# Patient Record
Sex: FEMALE | Race: BLACK OR AFRICAN AMERICAN | NOT HISPANIC OR LATINO | Employment: FULL TIME | URBAN - METROPOLITAN AREA
[De-identification: names, ages, dates, MRNs, and addresses within clinical notes are randomized per-mention and may not be internally consistent; named-entity substitution may affect disease eponyms.]

---

## 2023-12-18 PROCEDURE — 87205 SMEAR GRAM STAIN: CPT

## 2023-12-19 ENCOUNTER — LAB REQUISITION (OUTPATIENT)
Dept: LAB | Facility: HOSPITAL | Age: 43
End: 2023-12-19
Payer: COMMERCIAL

## 2023-12-19 DIAGNOSIS — N77.1 VAGINITIS, VULVITIS AND VULVOVAGINITIS IN DISEASES CLASSIFIED ELSEWHERE: ICD-10-CM

## 2023-12-20 LAB
CLUE CELLS VAG LPF-#/AREA: ABNORMAL /[LPF]
NUGENT SCORE: 7
YEAST VAG WET PREP-#/AREA: ABNORMAL

## 2024-04-09 ENCOUNTER — HOSPITAL ENCOUNTER (EMERGENCY)
Facility: HOSPITAL | Age: 44
Discharge: HOME | End: 2024-04-09
Attending: STUDENT IN AN ORGANIZED HEALTH CARE EDUCATION/TRAINING PROGRAM
Payer: COMMERCIAL

## 2024-04-09 ENCOUNTER — APPOINTMENT (OUTPATIENT)
Dept: CARDIOLOGY | Facility: HOSPITAL | Age: 44
End: 2024-04-09
Payer: COMMERCIAL

## 2024-04-09 ENCOUNTER — APPOINTMENT (OUTPATIENT)
Dept: RADIOLOGY | Facility: HOSPITAL | Age: 44
End: 2024-04-09
Payer: COMMERCIAL

## 2024-04-09 VITALS
OXYGEN SATURATION: 97 % | HEIGHT: 63 IN | TEMPERATURE: 97.9 F | WEIGHT: 165.34 LBS | BODY MASS INDEX: 29.3 KG/M2 | DIASTOLIC BLOOD PRESSURE: 77 MMHG | HEART RATE: 74 BPM | RESPIRATION RATE: 15 BRPM | SYSTOLIC BLOOD PRESSURE: 151 MMHG

## 2024-04-09 DIAGNOSIS — R07.9 CHEST PAIN, UNSPECIFIED TYPE: Primary | ICD-10-CM

## 2024-04-09 DIAGNOSIS — R00.2 PALPITATIONS: ICD-10-CM

## 2024-04-09 LAB
ALBUMIN SERPL-MCNC: 4.6 G/DL (ref 3.5–5)
ALP BLD-CCNC: 83 U/L (ref 35–125)
ALT SERPL-CCNC: 14 U/L (ref 5–40)
ANION GAP SERPL CALC-SCNC: 12 MMOL/L
APPEARANCE UR: CLEAR
AST SERPL-CCNC: 19 U/L (ref 5–40)
BASOPHILS # BLD AUTO: 0.04 X10*3/UL (ref 0–0.1)
BASOPHILS NFR BLD AUTO: 0.4 %
BILIRUB SERPL-MCNC: 0.9 MG/DL (ref 0.1–1.2)
BILIRUB UR STRIP.AUTO-MCNC: NEGATIVE MG/DL
BUN SERPL-MCNC: 8 MG/DL (ref 8–25)
CALCIUM SERPL-MCNC: 10 MG/DL (ref 8.5–10.4)
CHLORIDE SERPL-SCNC: 96 MMOL/L (ref 97–107)
CO2 SERPL-SCNC: 25 MMOL/L (ref 24–31)
COLOR UR: COLORLESS
CREAT SERPL-MCNC: 0.9 MG/DL (ref 0.4–1.6)
EGFRCR SERPLBLD CKD-EPI 2021: 82 ML/MIN/1.73M*2
EOSINOPHIL # BLD AUTO: 0.05 X10*3/UL (ref 0–0.7)
EOSINOPHIL NFR BLD AUTO: 0.5 %
ERYTHROCYTE [DISTWIDTH] IN BLOOD BY AUTOMATED COUNT: 11.8 % (ref 11.5–14.5)
GLUCOSE SERPL-MCNC: 90 MG/DL (ref 65–99)
GLUCOSE UR STRIP.AUTO-MCNC: NORMAL MG/DL
HCG UR QL IA.RAPID: NEGATIVE
HCT VFR BLD AUTO: 41.1 % (ref 36–46)
HGB BLD-MCNC: 14 G/DL (ref 12–16)
IMM GRANULOCYTES # BLD AUTO: 0.03 X10*3/UL (ref 0–0.7)
IMM GRANULOCYTES NFR BLD AUTO: 0.3 % (ref 0–0.9)
KETONES UR STRIP.AUTO-MCNC: ABNORMAL MG/DL
LEUKOCYTE ESTERASE UR QL STRIP.AUTO: NEGATIVE
LIPASE SERPL-CCNC: 26 U/L (ref 16–63)
LYMPHOCYTES # BLD AUTO: 3.13 X10*3/UL (ref 1.2–4.8)
LYMPHOCYTES NFR BLD AUTO: 29.7 %
MCH RBC QN AUTO: 29.6 PG (ref 26–34)
MCHC RBC AUTO-ENTMCNC: 34.1 G/DL (ref 32–36)
MCV RBC AUTO: 87 FL (ref 80–100)
MONOCYTES # BLD AUTO: 0.87 X10*3/UL (ref 0.1–1)
MONOCYTES NFR BLD AUTO: 8.3 %
NEUTROPHILS # BLD AUTO: 6.42 X10*3/UL (ref 1.2–7.7)
NEUTROPHILS NFR BLD AUTO: 60.8 %
NITRITE UR QL STRIP.AUTO: NEGATIVE
NRBC BLD-RTO: 0 /100 WBCS (ref 0–0)
PH UR STRIP.AUTO: 6 [PH]
PLATELET # BLD AUTO: 237 X10*3/UL (ref 150–450)
POTASSIUM SERPL-SCNC: 4.3 MMOL/L (ref 3.4–5.1)
PROT SERPL-MCNC: 6.9 G/DL (ref 5.9–7.9)
PROT UR STRIP.AUTO-MCNC: NEGATIVE MG/DL
RBC # BLD AUTO: 4.73 X10*6/UL (ref 4–5.2)
RBC # UR STRIP.AUTO: NEGATIVE /UL
SODIUM SERPL-SCNC: 133 MMOL/L (ref 133–145)
SP GR UR STRIP.AUTO: 1
TROPONIN T SERPL-MCNC: <6 NG/L
UROBILINOGEN UR STRIP.AUTO-MCNC: NORMAL MG/DL
WBC # BLD AUTO: 10.5 X10*3/UL (ref 4.4–11.3)

## 2024-04-09 PROCEDURE — 83690 ASSAY OF LIPASE: CPT | Performed by: PHYSICIAN ASSISTANT

## 2024-04-09 PROCEDURE — 81003 URINALYSIS AUTO W/O SCOPE: CPT | Performed by: PHYSICIAN ASSISTANT

## 2024-04-09 PROCEDURE — 74177 CT ABD & PELVIS W/CONTRAST: CPT | Performed by: RADIOLOGY

## 2024-04-09 PROCEDURE — 71046 X-RAY EXAM CHEST 2 VIEWS: CPT | Performed by: RADIOLOGY

## 2024-04-09 PROCEDURE — 99285 EMERGENCY DEPT VISIT HI MDM: CPT | Mod: 25

## 2024-04-09 PROCEDURE — 2550000001 HC RX 255 CONTRASTS: Performed by: PHYSICIAN ASSISTANT

## 2024-04-09 PROCEDURE — 93005 ELECTROCARDIOGRAM TRACING: CPT

## 2024-04-09 PROCEDURE — 96361 HYDRATE IV INFUSION ADD-ON: CPT

## 2024-04-09 PROCEDURE — 71046 X-RAY EXAM CHEST 2 VIEWS: CPT

## 2024-04-09 PROCEDURE — 36415 COLL VENOUS BLD VENIPUNCTURE: CPT | Performed by: STUDENT IN AN ORGANIZED HEALTH CARE EDUCATION/TRAINING PROGRAM

## 2024-04-09 PROCEDURE — 74177 CT ABD & PELVIS W/CONTRAST: CPT

## 2024-04-09 PROCEDURE — 85025 COMPLETE CBC W/AUTO DIFF WBC: CPT | Performed by: STUDENT IN AN ORGANIZED HEALTH CARE EDUCATION/TRAINING PROGRAM

## 2024-04-09 PROCEDURE — 80053 COMPREHEN METABOLIC PANEL: CPT | Performed by: STUDENT IN AN ORGANIZED HEALTH CARE EDUCATION/TRAINING PROGRAM

## 2024-04-09 PROCEDURE — 96375 TX/PRO/DX INJ NEW DRUG ADDON: CPT

## 2024-04-09 PROCEDURE — 96374 THER/PROPH/DIAG INJ IV PUSH: CPT

## 2024-04-09 PROCEDURE — 81025 URINE PREGNANCY TEST: CPT | Performed by: PHYSICIAN ASSISTANT

## 2024-04-09 PROCEDURE — 84484 ASSAY OF TROPONIN QUANT: CPT | Performed by: STUDENT IN AN ORGANIZED HEALTH CARE EDUCATION/TRAINING PROGRAM

## 2024-04-09 PROCEDURE — 2500000004 HC RX 250 GENERAL PHARMACY W/ HCPCS (ALT 636 FOR OP/ED): Performed by: PHYSICIAN ASSISTANT

## 2024-04-09 PROCEDURE — 2500000001 HC RX 250 WO HCPCS SELF ADMINISTERED DRUGS (ALT 637 FOR MEDICARE OP): Performed by: PHYSICIAN ASSISTANT

## 2024-04-09 RX ORDER — FAMOTIDINE 20 MG/1
40 TABLET, FILM COATED ORAL DAILY
Qty: 30 TABLET | Refills: 0 | Status: SHIPPED | OUTPATIENT
Start: 2024-04-09 | End: 2024-04-24

## 2024-04-09 RX ORDER — FAMOTIDINE 10 MG/ML
20 INJECTION INTRAVENOUS ONCE
Status: COMPLETED | OUTPATIENT
Start: 2024-04-09 | End: 2024-04-09

## 2024-04-09 RX ORDER — NAPROXEN SODIUM 220 MG/1
324 TABLET, FILM COATED ORAL ONCE
Status: COMPLETED | OUTPATIENT
Start: 2024-04-09 | End: 2024-04-09

## 2024-04-09 RX ORDER — ONDANSETRON HYDROCHLORIDE 2 MG/ML
4 INJECTION, SOLUTION INTRAVENOUS ONCE
Status: COMPLETED | OUTPATIENT
Start: 2024-04-09 | End: 2024-04-09

## 2024-04-09 RX ADMIN — ONDANSETRON 4 MG: 2 INJECTION INTRAMUSCULAR; INTRAVENOUS at 19:06

## 2024-04-09 RX ADMIN — ASPIRIN 81 MG CHEWABLE TABLET 324 MG: 81 TABLET CHEWABLE at 19:06

## 2024-04-09 RX ADMIN — FAMOTIDINE 20 MG: 10 INJECTION, SOLUTION INTRAVENOUS at 19:05

## 2024-04-09 RX ADMIN — IOHEXOL 75 ML: 350 INJECTION, SOLUTION INTRAVENOUS at 20:02

## 2024-04-09 RX ADMIN — SODIUM CHLORIDE 1000 ML: 900 INJECTION, SOLUTION INTRAVENOUS at 19:06

## 2024-04-09 SDOH — HEALTH STABILITY: MENTAL HEALTH: HAVE YOU ACTUALLY HAD ANY THOUGHTS OF KILLING YOURSELF?: NO

## 2024-04-09 SDOH — HEALTH STABILITY: MENTAL HEALTH: SUICIDE ASSESSMENT: ADULT (C-SSRS)

## 2024-04-09 SDOH — HEALTH STABILITY: MENTAL HEALTH: HAVE YOU EVER DONE ANYTHING, STARTED TO DO ANYTHING, OR PREPARED TO DO ANYTHING TO END YOUR LIFE?: NO

## 2024-04-09 SDOH — HEALTH STABILITY: MENTAL HEALTH: HAVE YOU WISHED YOU WERE DEAD OR WISHED YOU COULD GO TO SLEEP AND NOT WAKE UP?: NO

## 2024-04-09 ASSESSMENT — PAIN - FUNCTIONAL ASSESSMENT: PAIN_FUNCTIONAL_ASSESSMENT: 0-10

## 2024-04-09 ASSESSMENT — PAIN SCALES - GENERAL: PAINLEVEL_OUTOF10: 6

## 2024-04-09 ASSESSMENT — PAIN DESCRIPTION - PAIN TYPE: TYPE: ACUTE PAIN

## 2024-04-09 ASSESSMENT — PAIN DESCRIPTION - DESCRIPTORS: DESCRIPTORS: TIGHTNESS;PRESSURE

## 2024-04-09 ASSESSMENT — COLUMBIA-SUICIDE SEVERITY RATING SCALE - C-SSRS
2. HAVE YOU ACTUALLY HAD ANY THOUGHTS OF KILLING YOURSELF?: NO
1. IN THE PAST MONTH, HAVE YOU WISHED YOU WERE DEAD OR WISHED YOU COULD GO TO SLEEP AND NOT WAKE UP?: NO
6. HAVE YOU EVER DONE ANYTHING, STARTED TO DO ANYTHING, OR PREPARED TO DO ANYTHING TO END YOUR LIFE?: NO

## 2024-04-09 ASSESSMENT — PAIN DESCRIPTION - PROGRESSION: CLINICAL_PROGRESSION: GRADUALLY WORSENING

## 2024-04-09 ASSESSMENT — PAIN DESCRIPTION - LOCATION: LOCATION: CHEST

## 2024-04-09 ASSESSMENT — PAIN DESCRIPTION - FREQUENCY: FREQUENCY: CONSTANT/CONTINUOUS

## 2024-04-09 ASSESSMENT — PAIN DESCRIPTION - ONSET: ONSET: GRADUAL

## 2024-04-09 NOTE — ED PROVIDER NOTES
HPI   Chief Complaint   Patient presents with    Chest Pain    Anxiety    Palpitations     X 1 week of palpitations, hx of svt, feels like she can't get a full breath,light headed,  chest tightness and pressure x 1 days, hx of anxiety , nauseated , been having heartburn over the last week        This is a 43-year-old female who presents to the emergency department with complaints of tightness in her chest with palpitations.  Patient states that she does have a history of SVT for which she takes metoprolol.  Patient states for the last month she has been having episodes of palpitations.  She states that palpitations can happen both at rest and with activity.  She states that when she is having the palpitations there is no associated chest pain or shortness of breath.  She states that she began to experience a tight sensation in her chest today which is not consistent with her palpitations and previous episodes of anxiety which prompted her to come to the emergency department.  Patient has been having episodes of chest discomfort over the last month without alleviating or exacerbating factors.  She states that for the last month or so she has also been having what she believes is acid reflux as she has a burning sensation in the middle of her chest radiating up to her throat.  She has also had episodes of nausea with eating and had 1 episode of vomiting today due to to nausea after eating.  She states she has been having increased belching.  She states that she did wear a Zio patch prior approximately 2 years ago.  She states that she does see a psychiatrist for her anxiety.  She is not suicidal or homicidal.  She is starting therapy on Monday.  States that she has also been having abdominal pain for the last week.  The pain is located in the right upper quadrant right lower quadrant.  She has not had any dysuria, hematuria, increased urgency or frequency.  No diarrhea or constipation.  No recent travel.  She does not  have a history of cancer, no recent travel, no hemoptysis, no previous surgeries to include abdominal surgeries, and she does take oral birth control.       Please see HPI for pertinent positive and negative ROS.                     No data recorded                   Patient History   History reviewed. No pertinent past medical history.  Past Surgical History:   Procedure Laterality Date    OTHER SURGICAL HISTORY  05/05/2022    No history of surgery     No family history on file.  Social History     Tobacco Use    Smoking status: Not on file    Smokeless tobacco: Not on file   Substance Use Topics    Alcohol use: Not on file    Drug use: Not on file       Physical Exam   ED Triage Vitals [04/09/24 1821]   Temperature Heart Rate Respirations BP   36.6 °C (97.9 °F) 83 16 (!) 171/95      Pulse Ox Temp Source Heart Rate Source Patient Position   100 % Oral Monitor Sitting      BP Location FiO2 (%)     Right arm --       Physical Exam  GENERAL APPEARANCE: Awake and alert. No acute distress.   VITAL SIGNS: As per the nurses' triage record.  HEENT: Normocephalic, atraumatic. Extraocular muscles are intact. Conjunctiva are pink. Negative scleral icterus. Mucous membranes are moist. Tongue in the midline. Oropharynx clear, uvula midline.   NECK: Soft, nontender and supple, full gross ROM, no meningeal signs.  CHEST: Nontender to palpation. Clear to auscultation bilaterally. No rales, rhonchi, or wheezing. Symmetric rise and fall of chest wall.   HEART: Clear S1 and S2. Regular rate and rhythm. No murmurs appreciated on auscultation.  Strong and equal pulses in the extremities.  ABDOMEN: Soft, nontender, nondistended, positive bowel sounds, no palpable masses.  MUSCULOSKELETAL: The calves are nontender to palpation.  No calf swelling bilaterally.  Full gross active range of motion. Ambulating on own with no acute difficulties  NEUROLOGICAL: Awake, alert and oriented x 3. Motor power intact in the upper and lower extremities.  Sensation is intact to light touch in the upper and lower extremities. Patient answering questions appropriately.   IMMUNOLOGICAL: No lymphatic streaking noted  DERMATOLOGIC: Warm and dry without petechiae, rashes, or ecchymosis noted on visible skin.   PYSCH: Cooperative with appropriate mood and affect.  ED Course & MDM   Diagnoses as of 04/09/24 2120   Chest pain, unspecified type   Palpitations       Medical Decision Making  Parts of this chart have been completed using voice recognition software. Please excuse any errors of transcription.  My thought process and reason for plan has been formulated from the time that I saw the patient until the time of disposition and is not specific to one specific moment during their visit and furthermore my MDM encompasses this entire chart and not only this text box.      HPI: Detailed above.    Exam: A medically appropriate exam performed, outlined above, given the known history and presentation.    History obtained from: Patient    EKG: See my supervising physician's EKG interpretation    Medications given during visit:  Medications   aspirin chewable tablet 324 mg (324 mg oral Given 4/9/24 1906)   famotidine PF (Pepcid) injection 20 mg (20 mg intravenous Given 4/9/24 1905)   ondansetron (Zofran) injection 4 mg (4 mg intravenous Given 4/9/24 1906)   sodium chloride 0.9 % bolus 1,000 mL (1,000 mL intravenous New Bag 4/9/24 1906)   iohexol (OMNIPaque) 350 mg iodine/mL solution 75 mL (75 mL intravenous Given 4/9/24 2002)        Diagnostic/tests  Labs Reviewed   COMPREHENSIVE METABOLIC PANEL - Abnormal       Result Value    Glucose 90      Sodium 133      Potassium 4.3      Chloride 96 (*)     Bicarbonate 25      Urea Nitrogen 8      Creatinine 0.90      eGFR 82      Calcium 10.0      Albumin 4.6      Alkaline Phosphatase 83      Total Protein 6.9      AST 19      Bilirubin, Total 0.9      ALT 14      Anion Gap 12     URINALYSIS WITH REFLEX CULTURE AND MICROSCOPIC - Abnormal     Color, Urine Colorless (*)     Appearance, Urine Clear      Specific Gravity, Urine 1.003 (*)     pH, Urine 6.0      Protein, Urine NEGATIVE      Glucose, Urine Normal      Blood, Urine NEGATIVE      Ketones, Urine TRACE (*)     Bilirubin, Urine NEGATIVE      Urobilinogen, Urine Normal      Nitrite, Urine NEGATIVE      Leukocyte Esterase, Urine NEGATIVE     SERIAL TROPONIN, INITIAL ([facility]) - Normal    Troponin T, High Sensitivity <6     HCG, URINE, QUALITATIVE - Normal    HCG, Urine NEGATIVE     LIPASE - Normal    Lipase 26     CBC WITH AUTO DIFFERENTIAL    WBC 10.5      nRBC 0.0      RBC 4.73      Hemoglobin 14.0      Hematocrit 41.1      MCV 87      MCH 29.6      MCHC 34.1      RDW 11.8      Platelets 237      Neutrophils % 60.8      Immature Granulocytes %, Automated 0.3      Lymphocytes % 29.7      Monocytes % 8.3      Eosinophils % 0.5      Basophils % 0.4      Neutrophils Absolute 6.42      Immature Granulocytes Absolute, Automated 0.03      Lymphocytes Absolute 3.13      Monocytes Absolute 0.87      Eosinophils Absolute 0.05      Basophils Absolute 0.04     TROPONIN T SERIES, HIGH SENSITIVITY (0, 2 HR, 6 HR)    Narrative:     The following orders were created for panel order Troponin T Series, High Sensitivity (0, 2HR, 6HR).  Procedure                               Abnormality         Status                     ---------                               -----------         ------                     Serial Troponin, Initial...[[id number]]  Normal              Final result               Serial Troponin, 2 Hour ...[[id number]]                                                   Please view results for these tests on the individual orders.   SERIAL TROPONIN,  2 HOUR ([facility])   URINALYSIS WITH REFLEX CULTURE AND MICROSCOPIC    Narrative:     The following orders were created for panel order Urinalysis with Reflex Culture and Microscopic.  Procedure                               Abnormality         Status                      ---------                               -----------         ------                     Urinalysis with Reflex C...[[id number]]  Abnormal            Final result               Extra Urine Gray Tube[[id number]]                                                         Please view results for these tests on the individual orders.   EXTRA URINE GRAY TUBE      CT abdomen pelvis w IV contrast   Final Result   1. No acute abnormality within the abdomen or pelvis.   2. No periappendiceal inflammation.   3. Mild prominence without overt enlargement of multiple right mid   abdominal and lower quadrant lymph nodes. Consider mesenteric   adenitis.        MACRO:   None        Signed by: Sanjeev Quiroz 4/9/2024 8:44 PM   Dictation workstation:   [id number]      XR chest 2 views   Final Result   1.  No evidence of acute cardiopulmonary process.                  MACRO:   None        Signed by: Lokesh Pichardo 4/9/2024 7:36 PM   Dictation workstation:   [id number]           Considerations/further MDM:  Patient was seen in conjucntion with my supervising physician,  Dr. Pickens. Please refer to his note.    Patient presents hypertensive, afebrile, 83 bpm, 16 for respirations, 100% on room air.    Differential diagnosis includes but is not limited to acute coronary syndrome including myocardial infarction versus pneumothorax versus pneumonia versus pulmonary embolism versus aortic dissection versus GERD    PERC rule for pulmonary embolism is 1 given patient is on oral birth control.  She does not have any evidence of hypoxia or tachycardia.  She is not short of breath currently.  Therefore, I do have low suspicion for pulmonary embolism and further workup was not clinically indicated.    Initial troponin T HS <6, repeat troponin T was not clinically indicated as patient symptoms have been ongoing x 1 month.  Also showed no evidence of urinary tract infection.  CBC showed no leukocytosis or anemia.  Negative hCG.  Lipase 26.  CMP  showed no electro disturbance or acute kidney injury.    Chest x-ray shows no acute cardiopulmonary process.  CT scan of abdomen pelvis with IV contrast shows no acute findings.  There was lymphadenopathy consistent with possible mesenteric adenitis.  This could explain patient's aching abdominal discomfort x 1 week.    Patient was treated with IV fluids, IV famotidine, IV Zofran, and oral aspirin.  Patient symptoms improved in the emergency department.  Patient is not suicidal or homicidal.  She states that she does have close psychiatric follow-up for her anxiety and do not need to speak to anyone in the emergency department tonight.    Due to history of SVT with patient not seeing cardiology in 2 years, I did place a referral to cardiology for patient to follow-up with.    The patient was evaluated in the emergency department and an etiology requiring emergent treatment or admission to hospital was not identified.  Prescription for famotidine given.  All labs, imaging, and diagnostic studies were reviewed by me and the patient was counseled on clinical impression, expectations, and plan.  Patient was educated to follow-up with PCP in the following 1 to 2 days.  After trial of famotidine, PCP may determine if GI referral is indicated.  Patient also referred to cardiology.  All questions from patient were answered.  They elicited understanding and were agreeable to course of treatment.  Patient was discharged in stable condition and given strict return precautions including new or worsening symptoms.      Procedure  Procedures     Kailee Douglas PA-C  04/09/24 2129

## 2024-04-10 ASSESSMENT — HEART SCORE
HISTORY: SLIGHTLY SUSPICIOUS
RISK FACTORS: NO KNOWN RISK FACTORS
ECG: NORMAL
HEART SCORE: 0
AGE: <45
TROPONIN: LESS THAN OR EQUAL TO NORMAL LIMIT

## 2024-04-10 NOTE — DISCHARGE INSTRUCTIONS
Please follow-up with cardiology listed on your discharge paperwork.  Please follow-up with your primary care doctor next 1 to 2 days.  Please take famotidine as prescribed.  A paper prescription was included in your discharge paperwork.  Please avoid greasy, acidic or spicy foods.  Please return to the emergency department with new or worsening symptoms or the onset of new symptoms.

## 2024-06-19 LAB
ATRIAL RATE: 75 BPM
P AXIS: 45 DEGREES
P OFFSET: 199 MS
P ONSET: 156 MS
PR INTERVAL: 132 MS
Q ONSET: 222 MS
QRS COUNT: 13 BEATS
QRS DURATION: 70 MS
QT INTERVAL: 392 MS
QTC CALCULATION(BAZETT): 437 MS
QTC FREDERICIA: 422 MS
R AXIS: 16 DEGREES
T AXIS: -1 DEGREES
T OFFSET: 418 MS
VENTRICULAR RATE: 75 BPM

## 2025-04-07 ENCOUNTER — OFFICE VISIT (OUTPATIENT)
Dept: PRIMARY CARE | Facility: EXTERNAL LOCATION | Age: 45
End: 2025-04-07
Payer: COMMERCIAL

## 2025-04-07 VITALS
TEMPERATURE: 99 F | HEART RATE: 65 BPM | SYSTOLIC BLOOD PRESSURE: 136 MMHG | OXYGEN SATURATION: 97 % | DIASTOLIC BLOOD PRESSURE: 84 MMHG

## 2025-04-07 DIAGNOSIS — T14.8XXA ABRASION: Primary | ICD-10-CM

## 2025-04-07 PROBLEM — J45.909 ASTHMA: Status: ACTIVE | Noted: 2024-02-15

## 2025-04-07 PROBLEM — E66.9 OBESITY (BMI 30-39.9): Status: ACTIVE | Noted: 2024-03-15

## 2025-04-07 PROBLEM — F07.81 POSTCONCUSSION SYNDROME: Status: ACTIVE | Noted: 2025-04-07

## 2025-04-07 PROBLEM — H90.0 CONDUCTIVE HEARING LOSS, BILATERAL: Status: ACTIVE | Noted: 2025-04-07

## 2025-04-07 PROBLEM — I47.10 SUPRAVENTRICULAR TACHYCARDIA: Status: ACTIVE | Noted: 2024-02-15

## 2025-04-07 PROBLEM — R42 DIZZINESS: Status: ACTIVE | Noted: 2025-04-07

## 2025-04-07 PROBLEM — E55.9 VITAMIN D DEFICIENCY, UNSPECIFIED: Status: ACTIVE | Noted: 2024-02-15

## 2025-04-07 PROBLEM — N95.1 PERIMENOPAUSAL: Status: ACTIVE | Noted: 2024-02-15

## 2025-04-07 PROBLEM — S09.90XA INJURY OF HEAD: Status: ACTIVE | Noted: 2025-04-07

## 2025-04-07 PROBLEM — K21.9 GERD (GASTROESOPHAGEAL REFLUX DISEASE): Status: ACTIVE | Noted: 2025-04-07

## 2025-04-07 PROBLEM — R51.9 HEADACHE: Status: ACTIVE | Noted: 2025-04-07

## 2025-04-07 PROBLEM — S06.0XAA BRAIN CONCUSSION: Status: ACTIVE | Noted: 2025-04-07

## 2025-04-07 PROBLEM — G93.32 CHRONIC FATIGUE SYNDROME: Status: ACTIVE | Noted: 2024-02-15

## 2025-04-07 PROBLEM — N39.3 SUI (STRESS URINARY INCONTINENCE, FEMALE): Status: ACTIVE | Noted: 2024-02-15

## 2025-04-07 PROBLEM — N81.83 WEAKENING OF RECTOVAGINAL TISSUE: Status: ACTIVE | Noted: 2024-11-04

## 2025-04-07 PROBLEM — E78.00 ELEVATED CHOLESTEROL: Status: ACTIVE | Noted: 2024-02-15

## 2025-04-07 RX ORDER — LORAZEPAM 0.5 MG/1
TABLET ORAL
COMMUNITY

## 2025-04-07 RX ORDER — METOPROLOL SUCCINATE 100 MG/1
TABLET, EXTENDED RELEASE ORAL
COMMUNITY
Start: 2020-09-30

## 2025-04-07 RX ORDER — RIZATRIPTAN BENZOATE 10 MG/1
10 TABLET ORAL
COMMUNITY
Start: 2025-02-13

## 2025-04-07 RX ORDER — FREMANEZUMAB-VFRM 225 MG/1.5ML
225 INJECTION SUBCUTANEOUS
COMMUNITY

## 2025-04-07 RX ORDER — LEVONORGESTREL/ETHIN.ESTRADIOL 0.1-0.02MG
1 TABLET ORAL
COMMUNITY
Start: 2024-04-05

## 2025-04-07 RX ORDER — ONDANSETRON 4 MG/1
TABLET, ORALLY DISINTEGRATING ORAL
COMMUNITY

## 2025-04-07 RX ORDER — VONOPRAZAN FUMARATE 26.72 MG/1
20 TABLET ORAL
COMMUNITY
Start: 2024-12-30

## 2025-04-07 RX ORDER — SERTRALINE HYDROCHLORIDE 100 MG/1
100 TABLET, FILM COATED ORAL
COMMUNITY
Start: 2024-07-23

## 2025-04-07 ASSESSMENT — ENCOUNTER SYMPTOMS
HEADACHES: 0
DIZZINESS: 0
NECK PAIN: 0
WOUND: 1

## 2025-04-07 NOTE — PROGRESS NOTES
Subjective   Patient ID: Denise Castorena is a 44 y.o. female who presents for Ear Injury (Cut in ear).    Pt presents for c/o cut in ear. Pt was using home ear camera to look in ear when she accidentally nicked the ear cannal. Pain, bleeding. No drainage from ear today. Pressure in ear. Hearing is normal.     Ear Drainage   There is pain in the left ear. This is a new problem. The current episode started yesterday. There has been no fever. Pertinent negatives include no ear discharge, headaches, hearing loss or neck pain.        Review of Systems   HENT:  Positive for ear pain. Negative for ear discharge and hearing loss.    Musculoskeletal:  Negative for neck pain.   Skin:  Positive for wound.   Neurological:  Negative for dizziness and headaches.       Objective   /84   Pulse 65   Temp 37.2 °C (99 °F)   LMP 03/28/2025 (Exact Date)   SpO2 97%     Physical Exam  Vitals and nursing note reviewed.   Constitutional:       General: She is not in acute distress.  HENT:      Left Ear: Tympanic membrane and external ear normal. There is no impacted cerumen.      Ears:      Comments: Superficial laceration to left ear canal at roughly 4 o'clock. Scabbed over. No active bleeding. No drainage.   Neurological:      General: No focal deficit present.      Mental Status: She is alert. Mental status is at baseline.   Psychiatric:         Mood and Affect: Mood normal.         Behavior: Behavior normal.         Thought Content: Thought content normal.         Judgment: Judgment normal.         Assessment/Plan   Diagnoses and all orders for this visit:  Abrasion    -superficial laceration to left ear canal  -allow wound to rest.  -do not apply any ointment for 48 hours as it might disrupt scab and cause bleeding.   -follow up if change in hearing, ear drainage, fever, worsening pain develops.   -follow up with ENT for any other concerns.

## 2025-04-30 ENCOUNTER — APPOINTMENT (OUTPATIENT)
Dept: OTOLARYNGOLOGY | Facility: CLINIC | Age: 45
End: 2025-04-30
Payer: COMMERCIAL

## 2025-04-30 ENCOUNTER — APPOINTMENT (OUTPATIENT)
Dept: AUDIOLOGY | Facility: CLINIC | Age: 45
End: 2025-04-30
Payer: COMMERCIAL

## 2025-04-30 VITALS — BODY MASS INDEX: 29.23 KG/M2 | HEIGHT: 63 IN | WEIGHT: 165 LBS | TEMPERATURE: 99 F

## 2025-04-30 DIAGNOSIS — H93.13 TINNITUS OF BOTH EARS: Primary | ICD-10-CM

## 2025-04-30 DIAGNOSIS — H60.41 KERATOSIS OBTURANS OF RIGHT EXTERNAL EAR CANAL: Primary | ICD-10-CM

## 2025-04-30 PROCEDURE — 92557 COMPREHENSIVE HEARING TEST: CPT | Performed by: AUDIOLOGIST

## 2025-04-30 PROCEDURE — 92550 TYMPANOMETRY & REFLEX THRESH: CPT | Performed by: AUDIOLOGIST

## 2025-04-30 PROCEDURE — 1036F TOBACCO NON-USER: CPT | Performed by: OTOLARYNGOLOGY

## 2025-04-30 PROCEDURE — 3008F BODY MASS INDEX DOCD: CPT | Performed by: OTOLARYNGOLOGY

## 2025-04-30 PROCEDURE — 99213 OFFICE O/P EST LOW 20 MIN: CPT | Performed by: OTOLARYNGOLOGY

## 2025-04-30 RX ORDER — BUPROPION HYDROCHLORIDE 300 MG/1
300 TABLET ORAL
COMMUNITY
Start: 2020-10-13

## 2025-04-30 NOTE — PROGRESS NOTES
AUDIOLOGY ADULT AUDIOMETRIC EVALUATION    Name:  Denise Castorena  :  1980  Age:  45 y.o.  Date of Evaluation:  2025    Reason for visit: Ms. Castorena is seen in the clinic today at the request of Jonah Lazaro MD in otolaryngology for an audiologic evaluation.     HISTORY  The patient reported that she used a video otoscope at home and saw what she believes is a cholesteatoma in her right ear.  Intermittent bilateral tinnitus was reported.    EVALUATION  See scanned audiogram: “Media” > “Audiology Report”.      RESULTS  Otoscopic Evaluation:  Right Ear: clear ear canal  Left Ear: clear ear canal    Immittance Measures:  Tympanometry:  Right Ear: Type A, normal tympanic membrane mobility with normal middle ear pressure   Left Ear: Type A, normal tympanic membrane mobility with normal middle ear pressure     Acoustic Reflexes:  Ipsilateral Right Ear: Present at normal levels at 500-4000 Hz   Ipsilateral Left Ear: Present at normal levels at 500-4000 Hz   Contralateral Right Ear: did not evaluate  Contralateral Left Ear: did not evaluate    Distortion Product Otoacoustic Emissions (DPOAEs):  Right Ear: Present at 8979-9082 Hz; absent at 8000 Hz   Left Ear: Present at 3334-0861 Hz; absent at 1000 and 7610-0653 Hz     Audiometry:  Test Technique and Reliability:   Standard audiometry via insert earphones/supra-aural headphones. Reliability is good.    Pure tone air and bone conduction audiometry:  Right Ear: normal hearing  Left Ear: normal hearing    Speech Audiometry (Word Recognition Scores):   Right Ear: Excellent, 100% in quiet at an average conversational level   Left Ear: Excellent, 100% in quiet at an average conversational level     IMPRESSIONS  Results of today's audiometric evaluation revealed normal hearing sensitivity with normal middle ear function bilaterally.    The presence of acoustic reflexes within normal intensity limits is consistent with normal middle ear and brainstem  function, and suggests that auditory sensitivity is not significantly impaired. Present DPOAEs suggest normal/near normal cochlear outer hair cell function and are consistent with no greater than a mild hearing loss at those frequencies. Absent DPOAEs are consistent with abnormal cochlear outer hair cell function at those frequencies.    RECOMMENDATIONS  - Follow up with otolaryngology today as scheduled.  - Audiologic evaluation in conjunction with otologic care or if an acute change is noted.   - Follow-up with medical care team as planned.    PATIENT EDUCATION  Discussed results, impressions and recommendations with the patient. Questions were addressed and the patient was encouraged to contact our office should concerns arise.    Time for this encounter: 11:30-12:00    Genny De La O M.A., CCC-A   Licensed Audiologist

## 2025-04-30 NOTE — PROGRESS NOTES
"Chief Complaint   Patient presents with    Follow-up     LOV 5/22 DR AMAYA, RT EAR       Date of Evaluation: 4/30/2025   Denise was seen today for follow-up.  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  This was debrided and I will see her back in 6 months      HPI  Denise Castorena is a 45 y.o. female here for evaluation of her right ear.  She felt as though the ear was abnormal.  She has a home otoscope camera and install a white spot on the canal.  She denies recurrent acute otitis media.  No eustachian tube dysfunction.  Her audiogram shows normal hearing and normal tympanometry.    Physical Exam:  Last Recorded Vitals  Temperature 37.2 °C (99 °F), height 1.6 m (5' 3\"), weight 74.8 kg (165 lb), last menstrual period 03/28/2025. , Body mass index is 29.23 kg/m².  []General appearance: Well-developed, well-nourished in no acute distress, conversant with normal voice quality    Head/face: No erythema or edema or facial tenderness, and normal facial nerve function bilaterally    External ear: Clear external auditory canals with normal pinnae, small squamous cyst in the lateral inferior right external auditory canal.  This was marsupialized and the skin was removed.  Tube status: N/A  Middle ear: Tympanic membranes intact and mobile, middle ears normal.  Tympanic membrane perforation: N/A  Mastoid bowl: N/A  Hearing: Normal conversational awareness at normal speech thresholds    Nose visualized using: Anterior rhinoscopy  Nasal dorsum: Nontraumatic midline appearance  Septum: Midline, nonobstructing  Inferior turbinates: Normal, pink  Secretions: Dry    Oral cavity and oropharynx: Normal  Teeth: Good condition  Floor of mouth: without lesions  Palate: Normal hard palate, soft palate and uvula  Oropharynx: Clear, no lesions present  Buccal mucosa: Normal without masses or lesions  Lips: " Normal    Nasopharynx: Inadequate mirror exam secondary to gag/anatomy    Neck:  Salivary glands: Normal bilateral parotid and submandibular glands by inspection and palpation.  Non-thyroid masses: No palpable masses or significant lymphadenopathy  Trachea: Midline  Thyroid: No thyromegaly or palpable nodules  Temporomandibular joint: Nontender  Cervical range of motion: Normal    Neurologic exam: Alert and oriented x3, appropriate affect.  Cranial nerves II-XII normal bilaterally  Extraocular movement: Extraocular movement intact, normal gaze alignment     Medical History[1]   Surgical History[2]       Medications:   Current Outpatient Medications   Medication Instructions    Ajovy Syringe 225 mg, Every 28 days    buPROPion XL (WELLBUTRIN XL) 300 mg, Daily RT    famotidine (PEPCID) 40 mg, oral, Daily    levonorgestreL-ethinyl estrad (Aviane, Alesse, Lessina) 0.1-20 mg-mcg tablet 1 tablet, Daily RT    LORazepam (Ativan) 0.5 mg tablet     metoprolol succinate XL (Toprol-XL) 100 mg 24 hr tablet Daily RT    ondansetron ODT (Zofran-ODT) 4 mg disintegrating tablet     rizatriptan (MAXALT) 10 mg    sertraline (ZOLOFT) 100 mg, Daily RT    Voquezna 20 mg, Daily RT        Allergies:  Allergies[3]       Jonah Lazaro MD         [1]   Past Medical History:  Diagnosis Date    Headache     SVT (supraventricular tachycardia) (CMS-HCC)    [2]   Past Surgical History:  Procedure Laterality Date    BLADDER REPAIR  11/2024    OTHER SURGICAL HISTORY  05/05/2022    No history of surgery   [3] No Known Allergies

## 2025-10-31 ENCOUNTER — APPOINTMENT (OUTPATIENT)
Dept: OTOLARYNGOLOGY | Facility: CLINIC | Age: 45
End: 2025-10-31
Payer: COMMERCIAL